# Patient Record
Sex: MALE | Race: WHITE | Employment: OTHER | URBAN - METROPOLITAN AREA
[De-identification: names, ages, dates, MRNs, and addresses within clinical notes are randomized per-mention and may not be internally consistent; named-entity substitution may affect disease eponyms.]

---

## 2022-04-21 ENCOUNTER — HOSPITAL ENCOUNTER (OUTPATIENT)
Age: 77
Discharge: HOME | End: 2022-04-21
Payer: MEDICARE

## 2022-04-21 LAB
ALBUMIN SERPL-MCNC: 4.3 GM/DL (ref 3.4–5)
ALP BLD-CCNC: 109 IU/L (ref 40–128)
ALT SERPL-CCNC: 24 U/L (ref 10–40)
ANION GAP SERPL CALCULATED.3IONS-SCNC: 11 MMOL/L (ref 4–16)
AST SERPL-CCNC: 21 IU/L (ref 15–37)
BILIRUB SERPL-MCNC: 0.7 MG/DL (ref 0–1)
BUN BLDV-MCNC: 17 MG/DL (ref 6–23)
CALCIUM SERPL-MCNC: 9.4 MG/DL (ref 8.3–10.6)
CHLORIDE BLD-SCNC: 102 MMOL/L (ref 99–110)
CHOLESTEROL: 128 MG/DL
CO2: 25 MMOL/L (ref 21–32)
CREAT SERPL-MCNC: 0.8 MG/DL (ref 0.9–1.3)
ERYTHROCYTE SEDIMENTATION RATE: 11 MM/HR (ref 0–20)
ESTIMATED AVERAGE GLUCOSE: 137 MG/DL
GFR AFRICAN AMERICAN: >60 ML/MIN/1.73M2
GFR NON-AFRICAN AMERICAN: >60 ML/MIN/1.73M2
GLUCOSE BLD-MCNC: 91 MG/DL (ref 70–99)
HBA1C MFR BLD: 6.4 % (ref 4.2–6.3)
HCT VFR BLD CALC: 44 % (ref 42–52)
HDLC SERPL-MCNC: 58 MG/DL
HEMOGLOBIN: 14.4 GM/DL (ref 13.5–18)
LDL CHOLESTEROL CALCULATED: 54 MG/DL
MAGNESIUM: 2.1 MG/DL (ref 1.8–2.4)
MCH RBC QN AUTO: 32.6 PG (ref 27–31)
MCHC RBC AUTO-ENTMCNC: 32.7 % (ref 32–36)
MCV RBC AUTO: 99.5 FL (ref 78–100)
PDW BLD-RTO: 13.9 % (ref 11.7–14.9)
PLATELET # BLD: 219 K/CU MM (ref 140–440)
PMV BLD AUTO: 10.7 FL (ref 7.5–11.1)
POTASSIUM SERPL-SCNC: 4.6 MMOL/L (ref 3.5–5.1)
PROSTATE SPECIFIC ANTIGEN: 1.22 NG/ML (ref 0–4)
RBC # BLD: 4.42 M/CU MM (ref 4.6–6.2)
SODIUM BLD-SCNC: 138 MMOL/L (ref 135–145)
T4 FREE: 1.41 NG/DL (ref 0.9–1.8)
TOTAL PROTEIN: 6.4 GM/DL (ref 6.4–8.2)
TRIGL SERPL-MCNC: 79 MG/DL
TSH HIGH SENSITIVITY: 3.85 UIU/ML (ref 0.27–4.2)
URIC ACID: 6.4 MG/DL (ref 3.5–7.2)
WBC # BLD: 6.2 K/CU MM (ref 4–10.5)

## 2022-04-21 PROCEDURE — 85027 COMPLETE CBC AUTOMATED: CPT

## 2022-04-21 PROCEDURE — 83735 ASSAY OF MAGNESIUM: CPT

## 2022-04-21 PROCEDURE — G0103 PSA SCREENING: HCPCS

## 2022-04-21 PROCEDURE — 84443 ASSAY THYROID STIM HORMONE: CPT

## 2022-04-21 PROCEDURE — 83036 HEMOGLOBIN GLYCOSYLATED A1C: CPT

## 2022-04-21 PROCEDURE — 80053 COMPREHEN METABOLIC PANEL: CPT

## 2022-04-21 PROCEDURE — 84550 ASSAY OF BLOOD/URIC ACID: CPT

## 2022-04-21 PROCEDURE — 36415 COLL VENOUS BLD VENIPUNCTURE: CPT

## 2022-04-21 PROCEDURE — 85652 RBC SED RATE AUTOMATED: CPT

## 2022-04-21 PROCEDURE — 80061 LIPID PANEL: CPT

## 2022-04-21 PROCEDURE — 84439 ASSAY OF FREE THYROXINE: CPT

## 2022-04-27 ENCOUNTER — HOSPITAL ENCOUNTER (OUTPATIENT)
Dept: WOMENS IMAGING | Age: 77
Discharge: HOME | End: 2022-04-27
Payer: MEDICARE

## 2022-04-27 DIAGNOSIS — M81.0 OSTEOPOROSIS, UNSPECIFIED OSTEOPOROSIS TYPE, UNSPECIFIED PATHOLOGICAL FRACTURE PRESENCE: ICD-10-CM

## 2022-04-27 PROCEDURE — 77080 DXA BONE DENSITY AXIAL: CPT

## 2022-06-01 ENCOUNTER — HOSPITAL ENCOUNTER (OUTPATIENT)
Dept: CT IMAGING | Age: 77
Discharge: HOME | End: 2022-06-01
Payer: MEDICARE

## 2022-06-01 DIAGNOSIS — I71.20 THORACIC AORTIC ANEURYSM WITHOUT RUPTURE: ICD-10-CM

## 2022-06-01 LAB
GFR AFRICAN AMERICAN: >60 ML/MIN/1.73M2
GFR NON-AFRICAN AMERICAN: >60 ML/MIN/1.73M2
POC CREATININE: 0.9 MG/DL (ref 0.9–1.3)

## 2022-06-01 PROCEDURE — 71275 CT ANGIOGRAPHY CHEST: CPT

## 2022-06-01 PROCEDURE — 2580000003 HC RX 258: Performed by: INTERNAL MEDICINE

## 2022-06-01 PROCEDURE — 6360000004 HC RX CONTRAST MEDICATION: Performed by: INTERNAL MEDICINE

## 2022-06-01 RX ORDER — SODIUM CHLORIDE 0.9 % (FLUSH) 0.9 %
10 SYRINGE (ML) INJECTION PRN
Status: DISCONTINUED | OUTPATIENT
Start: 2022-06-01 | End: 2022-06-02 | Stop reason: HOSPADM

## 2022-06-01 RX ADMIN — SODIUM CHLORIDE, PRESERVATIVE FREE 10 ML: 5 INJECTION INTRAVENOUS at 13:05

## 2022-06-01 RX ADMIN — IOPAMIDOL 95 ML: 755 INJECTION, SOLUTION INTRAVENOUS at 13:07

## 2022-06-13 ENCOUNTER — HOSPITAL ENCOUNTER (OUTPATIENT)
Dept: NON INVASIVE DIAGNOSTICS | Age: 77
Discharge: HOME | End: 2022-06-13
Payer: MEDICARE

## 2022-06-13 VITALS
RESPIRATION RATE: 19 BRPM | SYSTOLIC BLOOD PRESSURE: 109 MMHG | DIASTOLIC BLOOD PRESSURE: 53 MMHG | HEART RATE: 74 BPM | OXYGEN SATURATION: 93 %

## 2022-06-13 PROCEDURE — 93312 ECHO TRANSESOPHAGEAL: CPT

## 2022-06-13 PROCEDURE — 7100000000 HC PACU RECOVERY - FIRST 15 MIN

## 2022-06-13 PROCEDURE — 7100000001 HC PACU RECOVERY - ADDTL 15 MIN

## 2022-06-13 NOTE — H&P
[address] [place] [address], [address] [place] [address]                              HISTORY AND PHYSICAL    PATIENT NAME: Michel Mcdermott                     :        1945  MED REC NO:   [id number]                          ROOM:  ACCOUNT NO:   [de-identified]                           ADMIT DATE: 2022  PROVIDER:     Crystal Shaver MD    INDICATION:  Bicuspid aortic valve, aortic regurgitation, ascending  aortic aneurysm. HISTORY OF PRESENT ILLNESS:  This is a 79-year-old male patient who has  been followed as an outpatient. He has ascending aortic aneurysm  present. The patient was also seen by Dr. Any Deshpande also. The patient  is here for ESTRADA and aortic valve evaluation. The patient is relatively  active. The patient denies any chest pain or shortness of breath  present. No dizziness or lightheadedness present. The patient had a CT of the chest done not long ago in  here at the  hospital, on 2022. CT chest showed aneurysm of the ascending  aorta measuring 4.7 cm. No evidence of dissection present. He said it  was a little bit larger when _____ [place] last year. It appears to be  4.7 cm at this time. PAST MEDICAL HISTORY:  Seizure disorder, he is on Keppra. He has  hypertension, hyperlipidemia, bicuspid aortic valve, moderate aortic  stenosis and hyperlipidemia, carotid disease, hypertension, diastolic  dysfunction present. PAST SURGICAL HISTORY:  None. MEDICATIONS:  Alendronate, aspirin, Coreg, Keppra, Lasix, Lipitor,  _____, multivitamins. ALLERGIES:  SULFA. PHYSICAL EXAMINATION:  GENERAL:  The patient is awake, alert, and answering questions, not in  acute distress. VITAL SIGNS:  Temperature afebrile. Pulse is 65. Blood pressure is  133/80. HEENT:  Head is normocephalic and atraumatic. Pupils are equal and  reactive. CHEST:  Equal expansion. LUNGS:  Clear to auscultation.   No wheezing or rhonchi appreciated. HEART:  Regular rate and rhythm. ABDOMEN:  Soft and nontender. Bowel sounds are present. No  hepatosplenomegaly or guarding appreciated. EXTREMITIES:  No cyanosis or clubbing noted. NEUROLOGIC:  Cranial nerves II through II are grossly intact. LABORATORY DATA:  BUN is 17, creatinine 0.8. Total cholesterol is 128. LFTs are normal.  CBC is within normal range. IMPRESSION:  A 31-year-old male patient with ascending root aneurysm  present. He is on low dose beta-blockers, aspirin, statins and Lasix. The patient is here for ESTRADA to evaluate for his ascending root aneurysm  and aortic valve pathology. The patient appears to have bicuspid aortic  valve present. PLAN:  I will plan to proceed with ESTRADA. I will make further  recommendations. ASA is III. Mallampati is III.         Rosmery Mitchell MD    D: 06/13/2022 9:19:21       T: 06/13/2022 14:33:14     [id number]  Job#: [id number]     Doc#: [id number]    CC:

## 2022-06-13 NOTE — PROCEDURES
[address], [address] [place] [address]                                 ECHOCARDIOGRAM    PATIENT NAME: Georgi Kahn                     :        1945  MED REC NO:   [id number]                          ROOM:  ACCOUNT NO:   [de-identified]                           ADMIT DATE: 2022  PROVIDER:     Melanie Moore MD    ESTRADA    INDICATION:  Bicuspid aortic valve, aortic regurgitation. This is a 79-year-old male patient, who is brought to noninvasive lab. The patient received 4 mg of Versed and 25 mcg of fentanyl. Posterior  oropharynx was anesthetized using lidocaine gel. A mouthguard was  placed. A ESTRADA probe was advanced to the posterior oropharynx and mid  esophagus. Images were obtained. Left atrium is mild to moderately enlarged. No clot or thrombus noted  in the left atrium or left atrial appendage. Mitral valve is normal.   There is mild mitral regurgitation. LV function is preserved at 50-55%. Mild left ventricular hypertrophy present. Aortic valve is a bicuspid  aortic valve. One of the leaflet is opening and closing well, the other  one is slightly restricted. There is mild to moderate aortic  regurgitation. Ascending aortic root is dilated. It is about 4.7 cm. In the sinuses, it is about 4.5, in the distal about 4.7 cm. No ASD or  PFO noted. Color Doppler and bubble study both were negative. Tricuspid valve is normal.  Pulmonic valve is normal.    IMPRESSION:  1. Mild to moderate left atrial enlargement present. 2.  No clot or thrombus noted in the left atrium, left atrial appendage  or LV cavity. 3.  Mitral valve is normal.  Mild mitral regurgitation noted. 4.  LV function is preserved greater than 55%. 5.  No pericardial effusion noted. 6.  No ASD or PFO noted.   7.  Tricuspid valve and pulmonic valve are normal.  8.  Aortic valve is bicuspid valve, one of the leaflet is opening and  closing normally, the other one is restricted and calcified. Mild to  moderate aortic regurgitation noted. Ascending root is dilated about  4.7 cm present. The patient tolerated the procedure well. No complications noted. PLAN:  At this time, close observation is recommended. The patient  _____ with bicuspid aortic valve with no significant stenosis noted. Ascending root is dilated. We will continue beta-blockers and we will  have him follow up. We will make further recommendations.         Nalini Hernandes MD    D: 06/13/2022 9:34:28       T: 06/13/2022 12:53:09     [id number]  Job#: [id number]     Doc#: [id number]    CC:

## 2023-01-26 ENCOUNTER — HOSPITAL ENCOUNTER (OUTPATIENT)
Age: 78
Discharge: HOME | End: 2023-01-26
Payer: MEDICARE

## 2023-01-26 LAB
ALBUMIN SERPL-MCNC: 4.3 GM/DL (ref 3.4–5)
ALP BLD-CCNC: 81 IU/L (ref 40–128)
ALT SERPL-CCNC: 26 U/L (ref 10–40)
ANION GAP SERPL CALCULATED.3IONS-SCNC: 7 MMOL/L (ref 4–16)
AST SERPL-CCNC: 23 IU/L (ref 15–37)
BASOPHILS ABSOLUTE: 0.1 K/CU MM
BASOPHILS RELATIVE PERCENT: 0.8 % (ref 0–1)
BILIRUB SERPL-MCNC: 0.7 MG/DL (ref 0–1)
BUN BLDV-MCNC: 14 MG/DL (ref 6–23)
CALCIUM SERPL-MCNC: 9 MG/DL (ref 8.3–10.6)
CHLORIDE BLD-SCNC: 103 MMOL/L (ref 99–110)
CHOLESTEROL: 117 MG/DL
CO2: 29 MMOL/L (ref 21–32)
CREAT SERPL-MCNC: 0.9 MG/DL (ref 0.9–1.3)
CREATININE URINE: 162 MG/DL (ref 39–259)
DIFFERENTIAL TYPE: ABNORMAL
EOSINOPHILS ABSOLUTE: 0.3 K/CU MM
EOSINOPHILS RELATIVE PERCENT: 3.8 % (ref 0–3)
ERYTHROCYTE SEDIMENTATION RATE: 5 MM/HR (ref 0–20)
ESTIMATED AVERAGE GLUCOSE: 128 MG/DL
GFR SERPL CREATININE-BSD FRML MDRD: >60 ML/MIN/1.73M2
GLUCOSE BLD-MCNC: 97 MG/DL (ref 70–99)
HBA1C MFR BLD: 6.1 % (ref 4.2–6.3)
HCT VFR BLD CALC: 42.6 % (ref 42–52)
HDLC SERPL-MCNC: 60 MG/DL
HEMOGLOBIN: 13.6 GM/DL (ref 13.5–18)
IMMATURE NEUTROPHIL %: 0.5 % (ref 0–0.43)
LDL CHOLESTEROL CALCULATED: 45 MG/DL
LYMPHOCYTES ABSOLUTE: 1.5 K/CU MM
LYMPHOCYTES RELATIVE PERCENT: 22.8 % (ref 24–44)
MAGNESIUM: 2.1 MG/DL (ref 1.8–2.4)
MCH RBC QN AUTO: 31.9 PG (ref 27–31)
MCHC RBC AUTO-ENTMCNC: 31.9 % (ref 32–36)
MCV RBC AUTO: 99.8 FL (ref 78–100)
MICROALBUMIN/CREAT 24H UR: <1.2 MG/DL
MICROALBUMIN/CREAT UR-RTO: NORMAL MG/G CREAT (ref 0–30)
MONOCYTES ABSOLUTE: 0.7 K/CU MM
MONOCYTES RELATIVE PERCENT: 11.3 % (ref 0–4)
NUCLEATED RBC %: 0 %
PDW BLD-RTO: 14 % (ref 11.7–14.9)
PLATELET # BLD: 192 K/CU MM (ref 140–440)
PMV BLD AUTO: 11.4 FL (ref 7.5–11.1)
POTASSIUM SERPL-SCNC: 4.2 MMOL/L (ref 3.5–5.1)
RBC # BLD: 4.27 M/CU MM (ref 4.6–6.2)
SEGMENTED NEUTROPHILS ABSOLUTE COUNT: 4 K/CU MM
SEGMENTED NEUTROPHILS RELATIVE PERCENT: 60.8 % (ref 36–66)
SODIUM BLD-SCNC: 139 MMOL/L (ref 135–145)
T4 FREE: 1.44 NG/DL (ref 0.9–1.8)
TOTAL IMMATURE NEUTOROPHIL: 0.03 K/CU MM
TOTAL NUCLEATED RBC: 0 K/CU MM
TOTAL PROTEIN: 6.2 GM/DL (ref 6.4–8.2)
TRIGL SERPL-MCNC: 58 MG/DL
TSH HIGH SENSITIVITY: 3.17 UIU/ML (ref 0.27–4.2)
URIC ACID: 6.4 MG/DL (ref 3.5–7.2)
WBC # BLD: 6.6 K/CU MM (ref 4–10.5)

## 2023-01-26 PROCEDURE — 84443 ASSAY THYROID STIM HORMONE: CPT

## 2023-01-26 PROCEDURE — 36415 COLL VENOUS BLD VENIPUNCTURE: CPT

## 2023-01-26 PROCEDURE — 83036 HEMOGLOBIN GLYCOSYLATED A1C: CPT

## 2023-01-26 PROCEDURE — 84550 ASSAY OF BLOOD/URIC ACID: CPT

## 2023-01-26 PROCEDURE — 80053 COMPREHEN METABOLIC PANEL: CPT

## 2023-01-26 PROCEDURE — 83735 ASSAY OF MAGNESIUM: CPT

## 2023-01-26 PROCEDURE — 82570 ASSAY OF URINE CREATININE: CPT

## 2023-01-26 PROCEDURE — 85652 RBC SED RATE AUTOMATED: CPT

## 2023-01-26 PROCEDURE — 85025 COMPLETE CBC W/AUTO DIFF WBC: CPT

## 2023-01-26 PROCEDURE — 80061 LIPID PANEL: CPT

## 2023-01-26 PROCEDURE — 84153 ASSAY OF PSA TOTAL: CPT

## 2023-01-26 PROCEDURE — 84439 ASSAY OF FREE THYROXINE: CPT

## 2023-01-26 PROCEDURE — 84154 ASSAY OF PSA FREE: CPT

## 2023-01-26 PROCEDURE — 82043 UR ALBUMIN QUANTITATIVE: CPT

## 2023-01-27 LAB
PROSTATE SPECIFIC ANTIGEN FREE: 0.4 NG/ML
PROSTATE SPECIFIC ANTIGEN PERCENT FREE: 25 %
PROSTATE SPECIFIC ANTIGEN: 1.6 NG/ML (ref 0–4)

## 2023-02-20 ENCOUNTER — HOSPITAL ENCOUNTER (OUTPATIENT)
Age: 78
Discharge: HOME | End: 2023-02-20
Payer: MEDICARE

## 2023-02-20 LAB
BASOPHILS ABSOLUTE: 0 K/CU MM
BASOPHILS RELATIVE PERCENT: 0.7 % (ref 0–1)
DIFFERENTIAL TYPE: ABNORMAL
EOSINOPHILS ABSOLUTE: 0.3 K/CU MM
EOSINOPHILS RELATIVE PERCENT: 5.2 % (ref 0–3)
ERYTHROCYTE SEDIMENTATION RATE: 20 MM/HR (ref 0–20)
FERRITIN: 364 NG/ML (ref 30–400)
FOLATE SERPL-MCNC: 13.7 NG/ML (ref 3.1–17.5)
HCT VFR BLD CALC: 44.1 % (ref 42–52)
HEMOGLOBIN: 14.6 GM/DL (ref 13.5–18)
IMMATURE NEUTROPHIL %: 0.3 % (ref 0–0.43)
IRON: 126 UG/DL (ref 59–158)
LYMPHOCYTES ABSOLUTE: 1.7 K/CU MM
LYMPHOCYTES RELATIVE PERCENT: 28.6 % (ref 24–44)
MCH RBC QN AUTO: 32.4 PG (ref 27–31)
MCHC RBC AUTO-ENTMCNC: 33.1 % (ref 32–36)
MCV RBC AUTO: 97.8 FL (ref 78–100)
MONOCYTES ABSOLUTE: 0.7 K/CU MM
MONOCYTES RELATIVE PERCENT: 11.5 % (ref 0–4)
NUCLEATED RBC %: 0 %
PCT TRANSFERRIN: 42 % (ref 10–44)
PDW BLD-RTO: 13.5 % (ref 11.7–14.9)
PLATELET # BLD: 202 K/CU MM (ref 140–440)
PMV BLD AUTO: 11 FL (ref 7.5–11.1)
RBC # BLD: 4.51 M/CU MM (ref 4.6–6.2)
SEGMENTED NEUTROPHILS ABSOLUTE COUNT: 3.1 K/CU MM
SEGMENTED NEUTROPHILS RELATIVE PERCENT: 53.7 % (ref 36–66)
TOTAL IMMATURE NEUTOROPHIL: 0.02 K/CU MM
TOTAL IRON BINDING CAPACITY: 302 UG/DL (ref 250–450)
TOTAL NUCLEATED RBC: 0 K/CU MM
UNSATURATED IRON BINDING CAPACITY: 176 UG/DL (ref 110–370)
VITAMIN B-12: 692.8 PG/ML (ref 211–911)
WBC # BLD: 5.8 K/CU MM (ref 4–10.5)

## 2023-02-20 PROCEDURE — 82746 ASSAY OF FOLIC ACID SERUM: CPT

## 2023-02-20 PROCEDURE — 85652 RBC SED RATE AUTOMATED: CPT

## 2023-02-20 PROCEDURE — 83010 ASSAY OF HAPTOGLOBIN QUANT: CPT

## 2023-02-20 PROCEDURE — 83540 ASSAY OF IRON: CPT

## 2023-02-20 PROCEDURE — 83550 IRON BINDING TEST: CPT

## 2023-02-20 PROCEDURE — 82607 VITAMIN B-12: CPT

## 2023-02-20 PROCEDURE — 82728 ASSAY OF FERRITIN: CPT

## 2023-02-20 PROCEDURE — 85025 COMPLETE CBC W/AUTO DIFF WBC: CPT

## 2023-02-20 PROCEDURE — 36415 COLL VENOUS BLD VENIPUNCTURE: CPT

## 2023-02-22 LAB — HAPTOGLOB SERPL-MCNC: 179 MG/DL (ref 30–200)

## 2023-05-10 ENCOUNTER — OFFICE VISIT (OUTPATIENT)
Dept: ORTHOPEDIC SURGERY | Age: 78
End: 2023-05-10

## 2023-05-10 VITALS — SYSTOLIC BLOOD PRESSURE: 145 MMHG | DIASTOLIC BLOOD PRESSURE: 90 MMHG | HEART RATE: 66 BPM | OXYGEN SATURATION: 98 %

## 2023-05-10 DIAGNOSIS — M67.40 GANGLION CYST: Primary | ICD-10-CM

## 2023-05-10 ASSESSMENT — ENCOUNTER SYMPTOMS
DIARRHEA: 0
NAUSEA: 0
BACK PAIN: 0
COUGH: 0
VOMITING: 0
SORE THROAT: 0
COLOR CHANGE: 0
SHORTNESS OF BREATH: 0
WHEEZING: 0

## 2023-05-10 NOTE — PROGRESS NOTES
Patient is here for consult on left forearm cyst as diagnosed and referred by his PCP. Patient states this mass has been present for 10 years. 2/10 Pain with pressure applied, no pain when no pressure is applied. No history of injury or surgery in left arm.

## 2023-05-10 NOTE — PROGRESS NOTES
5/10/2023   Chief Complaint   Patient presents with    Arm Pain     Patient is here for consult on his left forearm, possible cyst        History of Present Illness:                             Mayo Pryor is a 70 y.o. male right hand-dominant male referred by PCP for evaluation and treatment of a left forearm mass, possible ganglion cyst.  Patient states that this has been present for approximately 10 years and is essentially nonpainful unless he applies direct pressure. He has had no treatment for it thus far. It is been the same consistency for the last 10 years without any changes. No additional orthopedic complaints including any associated numbness or tingling. No advanced imaging thus far. This my first time seeing the patient. No associated prior injury as well. Related history: negative for prior surgery, trauma, arthritis or disorders. Is affecting ADLs. Pain is 2/10 at it's worst.    Outside reports reviewed: none. Patient's medications, allergies, past medical, surgical, social and family histories were reviewed and updated as appropriate. Patient has had no treatment thus far. Medical History  Patient's medications, allergies, past medical, surgical, social and family histories were reviewed and updated as appropriate. Past Medical History:   Diagnosis Date    Aneurysm ([facility]     Thoracic    Cancer ([facility]     skin    Hyperlipidemia     Sleep apnea     bipap     No past surgical history on file.   Family History   Problem Relation Age of Onset    Heart Disease Mother     Heart Disease Father     Heart Disease Brother      Social History     Socioeconomic History    Marital status:    Tobacco Use    Smoking status: Former     Years: 20.00     Types: Cigarettes    Smokeless tobacco: Never   Vaping Use    Vaping Use: Never used   Substance and Sexual Activity    Alcohol use: Not Currently    Drug use: Never     Current Outpatient Medications   Medication Sig Dispense

## 2023-08-01 ENCOUNTER — HOSPITAL ENCOUNTER (OUTPATIENT)
Age: 78
Discharge: HOME | End: 2023-08-01
Payer: MEDICARE

## 2023-08-01 LAB
ANION GAP SERPL CALCULATED.3IONS-SCNC: 10 MMOL/L (ref 4–16)
APTT: 29.4 SECONDS (ref 25.1–37.1)
BASOPHILS ABSOLUTE: 0.1 K/CU MM
BASOPHILS RELATIVE PERCENT: 0.8 % (ref 0–1)
BUN SERPL-MCNC: 13 MG/DL (ref 6–23)
CALCIUM SERPL-MCNC: 9.1 MG/DL (ref 8.3–10.6)
CHLORIDE BLD-SCNC: 99 MMOL/L (ref 99–110)
CO2: 26 MMOL/L (ref 21–32)
CREAT SERPL-MCNC: 0.8 MG/DL (ref 0.9–1.3)
DIFFERENTIAL TYPE: ABNORMAL
EOSINOPHILS ABSOLUTE: 0.3 K/CU MM
EOSINOPHILS RELATIVE PERCENT: 5.1 % (ref 0–3)
GFR SERPL CREATININE-BSD FRML MDRD: >60 ML/MIN/1.73M2
GLUCOSE SERPL-MCNC: 132 MG/DL (ref 70–99)
HCT VFR BLD CALC: 41.8 % (ref 42–52)
HEMOGLOBIN: 13.4 GM/DL (ref 13.5–18)
IMMATURE NEUTROPHIL %: 0.3 % (ref 0–0.43)
INR BLD: 1 INDEX
LYMPHOCYTES ABSOLUTE: 1.5 K/CU MM
LYMPHOCYTES RELATIVE PERCENT: 24 % (ref 24–44)
MCH RBC QN AUTO: 31.5 PG (ref 27–31)
MCHC RBC AUTO-ENTMCNC: 32.1 % (ref 32–36)
MCV RBC AUTO: 98.1 FL (ref 78–100)
MONOCYTES ABSOLUTE: 0.8 K/CU MM
MONOCYTES RELATIVE PERCENT: 12.2 % (ref 0–4)
NUCLEATED RBC %: 0 %
PDW BLD-RTO: 13.9 % (ref 11.7–14.9)
PLATELET # BLD: 194 K/CU MM (ref 140–440)
PMV BLD AUTO: 10.8 FL (ref 7.5–11.1)
POTASSIUM SERPL-SCNC: 4.2 MMOL/L (ref 3.5–5.1)
PROTHROMBIN TIME: 13 SECONDS (ref 11.7–14.5)
RBC # BLD: 4.26 M/CU MM (ref 4.6–6.2)
SEGMENTED NEUTROPHILS ABSOLUTE COUNT: 3.7 K/CU MM
SEGMENTED NEUTROPHILS RELATIVE PERCENT: 57.6 % (ref 36–66)
SODIUM BLD-SCNC: 135 MMOL/L (ref 135–145)
TOTAL IMMATURE NEUTOROPHIL: 0.02 K/CU MM
TOTAL NUCLEATED RBC: 0 K/CU MM
WBC # BLD: 6.3 K/CU MM (ref 4–10.5)

## 2023-08-01 PROCEDURE — 85610 PROTHROMBIN TIME: CPT

## 2023-08-01 PROCEDURE — 80048 BASIC METABOLIC PNL TOTAL CA: CPT

## 2023-08-01 PROCEDURE — 85730 THROMBOPLASTIN TIME PARTIAL: CPT

## 2023-08-01 PROCEDURE — 85025 COMPLETE CBC W/AUTO DIFF WBC: CPT

## 2023-08-01 PROCEDURE — 36415 COLL VENOUS BLD VENIPUNCTURE: CPT

## 2024-01-22 ENCOUNTER — HOSPITAL ENCOUNTER (OUTPATIENT)
Age: 79
Discharge: HOME | End: 2024-01-22
Payer: MEDICARE

## 2024-01-22 LAB
25(OH)D3 SERPL-MCNC: 32.96 NG/ML
ALBUMIN SERPL-MCNC: 4.3 GM/DL (ref 3.4–5)
ALP BLD-CCNC: 94 IU/L (ref 40–129)
ALT SERPL-CCNC: 19 U/L (ref 10–40)
ANION GAP SERPL CALCULATED.3IONS-SCNC: 15 MMOL/L (ref 7–16)
AST SERPL-CCNC: 19 IU/L (ref 15–37)
BASOPHILS ABSOLUTE: 0 K/CU MM
BASOPHILS RELATIVE PERCENT: 0.6 % (ref 0–1)
BILIRUB SERPL-MCNC: 0.6 MG/DL (ref 0–1)
BILIRUBIN DIRECT: 0.2 MG/DL (ref 0–0.3)
BILIRUBIN URINE: NEGATIVE MG/DL
BILIRUBIN, INDIRECT: 0.4 MG/DL (ref 0–0.7)
BLOOD, URINE: NEGATIVE
BUN SERPL-MCNC: 12 MG/DL (ref 6–23)
CALCIUM SERPL-MCNC: 8.8 MG/DL (ref 8.3–10.6)
CHLORIDE BLD-SCNC: 102 MMOL/L (ref 99–110)
CHOLEST SERPL-MCNC: 127 MG/DL
CLARITY: CLEAR
CO2: 24 MMOL/L (ref 21–32)
COLOR: YELLOW
COMMENT UA: NORMAL
CREAT SERPL-MCNC: 0.7 MG/DL (ref 0.9–1.3)
DIFFERENTIAL TYPE: ABNORMAL
EOSINOPHILS ABSOLUTE: 0.4 K/CU MM
EOSINOPHILS RELATIVE PERCENT: 6 % (ref 0–3)
ERYTHROCYTE SEDIMENTATION RATE: 14 MM/HR (ref 0–20)
ESTIMATED AVERAGE GLUCOSE: 128 MG/DL
GFR SERPL CREATININE-BSD FRML MDRD: >60 ML/MIN/1.73M2
GLUCOSE SERPL-MCNC: 107 MG/DL (ref 70–99)
GLUCOSE, URINE: NEGATIVE MG/DL
HBA1C MFR BLD: 6.1 % (ref 4.2–6.3)
HCT VFR BLD CALC: 44 % (ref 42–52)
HDLC SERPL-MCNC: 57 MG/DL
HEMOGLOBIN: 14.2 GM/DL (ref 13.5–18)
IMMATURE NEUTROPHIL %: 0.3 % (ref 0–0.43)
KETONES, URINE: NEGATIVE MG/DL
LDLC SERPL CALC-MCNC: 56 MG/DL
LEUKOCYTE ESTERASE, URINE: NEGATIVE
LYMPHOCYTES ABSOLUTE: 1.5 K/CU MM
LYMPHOCYTES RELATIVE PERCENT: 23.1 % (ref 24–44)
MAGNESIUM: 2.1 MG/DL (ref 1.8–2.4)
MCH RBC QN AUTO: 32.4 PG (ref 27–31)
MCHC RBC AUTO-ENTMCNC: 32.3 % (ref 32–36)
MCV RBC AUTO: 100.5 FL (ref 78–100)
MONOCYTES ABSOLUTE: 0.7 K/CU MM
MONOCYTES RELATIVE PERCENT: 10.6 % (ref 0–4)
NITRITE URINE, QUANTITATIVE: NEGATIVE
NUCLEATED RBC %: 0 %
PDW BLD-RTO: 13.5 % (ref 11.7–14.9)
PH, URINE: 7 (ref 5–8)
PLATELET # BLD: 210 K/CU MM (ref 140–440)
PMV BLD AUTO: 10.9 FL (ref 7.5–11.1)
POTASSIUM SERPL-SCNC: 4.7 MMOL/L (ref 3.5–5.1)
PROTEIN UA: NEGATIVE MG/DL
RBC # BLD: 4.38 M/CU MM (ref 4.6–6.2)
SEGMENTED NEUTROPHILS ABSOLUTE COUNT: 3.8 K/CU MM
SEGMENTED NEUTROPHILS RELATIVE PERCENT: 59.4 % (ref 36–66)
SODIUM BLD-SCNC: 141 MMOL/L (ref 135–145)
SPECIFIC GRAVITY UA: 1.01 (ref 1–1.03)
T4 FREE SERPL-MCNC: 1.41 NG/DL (ref 0.9–1.8)
TOTAL IMMATURE NEUTOROPHIL: 0.02 K/CU MM
TOTAL NUCLEATED RBC: 0 K/CU MM
TOTAL PROTEIN: 6.6 GM/DL (ref 6.4–8.2)
TRIGL SERPL-MCNC: 71 MG/DL
TSH SERPL DL<=0.005 MIU/L-ACNC: 2.57 UIU/ML (ref 0.27–4.2)
URATE SERPL-MCNC: 5.2 MG/DL (ref 3.5–7.2)
UROBILINOGEN, URINE: 0.2 MG/DL (ref 0.2–1)
WBC # BLD: 6.3 K/CU MM (ref 4–10.5)

## 2024-01-22 PROCEDURE — 84439 ASSAY OF FREE THYROXINE: CPT

## 2024-01-22 PROCEDURE — 82248 BILIRUBIN DIRECT: CPT

## 2024-01-22 PROCEDURE — 83036 HEMOGLOBIN GLYCOSYLATED A1C: CPT

## 2024-01-22 PROCEDURE — 84550 ASSAY OF BLOOD/URIC ACID: CPT

## 2024-01-22 PROCEDURE — 80061 LIPID PANEL: CPT

## 2024-01-22 PROCEDURE — 84443 ASSAY THYROID STIM HORMONE: CPT

## 2024-01-22 PROCEDURE — 85652 RBC SED RATE AUTOMATED: CPT

## 2024-01-22 PROCEDURE — 80053 COMPREHEN METABOLIC PANEL: CPT

## 2024-01-22 PROCEDURE — 85025 COMPLETE CBC W/AUTO DIFF WBC: CPT

## 2024-01-22 PROCEDURE — 81003 URINALYSIS AUTO W/O SCOPE: CPT

## 2024-01-22 PROCEDURE — 82306 VITAMIN D 25 HYDROXY: CPT

## 2024-01-22 PROCEDURE — 36415 COLL VENOUS BLD VENIPUNCTURE: CPT

## 2024-01-22 PROCEDURE — 83735 ASSAY OF MAGNESIUM: CPT

## 2024-10-09 ENCOUNTER — HOSPITAL ENCOUNTER (OUTPATIENT)
Dept: GENERAL RADIOLOGY | Age: 79
Discharge: HOME | End: 2024-10-09
Payer: MEDICARE

## 2024-10-09 ENCOUNTER — HOSPITAL ENCOUNTER (OUTPATIENT)
Age: 79
Discharge: HOME | End: 2024-10-09
Payer: MEDICARE

## 2024-10-09 DIAGNOSIS — M79.673 ACUTE FOOT PAIN, UNSPECIFIED LATERALITY: ICD-10-CM

## 2024-10-09 PROCEDURE — 73630 X-RAY EXAM OF FOOT: CPT

## 2025-07-15 ENCOUNTER — APPOINTMENT (OUTPATIENT)
Dept: CT IMAGING | Age: 80
End: 2025-07-15
Payer: MEDICARE

## 2025-07-15 ENCOUNTER — HOSPITAL ENCOUNTER (EMERGENCY)
Age: 80
Discharge: HOME | End: 2025-07-15
Attending: STUDENT IN AN ORGANIZED HEALTH CARE EDUCATION/TRAINING PROGRAM
Payer: MEDICARE

## 2025-07-15 VITALS
DIASTOLIC BLOOD PRESSURE: 89 MMHG | HEART RATE: 93 BPM | WEIGHT: 200 LBS | BODY MASS INDEX: 26.51 KG/M2 | RESPIRATION RATE: 22 BRPM | HEIGHT: 73 IN | TEMPERATURE: 98.6 F | SYSTOLIC BLOOD PRESSURE: 125 MMHG | OXYGEN SATURATION: 94 %

## 2025-07-15 DIAGNOSIS — G40.919 BREAKTHROUGH SEIZURE (HCC): Primary | ICD-10-CM

## 2025-07-15 LAB
ANION GAP SERPL CALCULATED.3IONS-SCNC: 11 MMOL/L (ref 9–17)
BASOPHILS # BLD: 0.01 K/UL
BASOPHILS NFR BLD: 0 % (ref 0–1)
BUN SERPL-MCNC: 10 MG/DL (ref 7–20)
CALCIUM SERPL-MCNC: 8.8 MG/DL (ref 8.3–10.6)
CHLORIDE SERPL-SCNC: 102 MMOL/L (ref 99–110)
CO2 SERPL-SCNC: 23 MMOL/L (ref 21–32)
CREAT SERPL-MCNC: 0.8 MG/DL (ref 0.8–1.3)
EOSINOPHIL # BLD: 0.01 K/UL
EOSINOPHILS RELATIVE PERCENT: 0 % (ref 0–3)
ERYTHROCYTE [DISTWIDTH] IN BLOOD BY AUTOMATED COUNT: 14.3 % (ref 11.7–14.9)
GFR, ESTIMATED: 90 ML/MIN/1.73M2
GLUCOSE SERPL-MCNC: 120 MG/DL (ref 74–99)
HCT VFR BLD AUTO: 40.5 % (ref 42–52)
HGB BLD-MCNC: 13.6 G/DL (ref 13.5–18)
IMM GRANULOCYTES # BLD AUTO: 0.03 K/UL
IMM GRANULOCYTES NFR BLD: 0 %
LYMPHOCYTES NFR BLD: 0.85 K/UL
LYMPHOCYTES RELATIVE PERCENT: 11 % (ref 24–44)
MCH RBC QN AUTO: 32.9 PG (ref 27–31)
MCHC RBC AUTO-ENTMCNC: 33.6 G/DL (ref 32–36)
MCV RBC AUTO: 97.8 FL (ref 78–100)
MONOCYTES NFR BLD: 0.74 K/UL
MONOCYTES NFR BLD: 9 % (ref 0–5)
NEUTROPHILS NFR BLD: 79 % (ref 36–66)
NEUTS SEG NFR BLD: 6.21 K/UL
PLATELET # BLD AUTO: 192 K/UL (ref 140–440)
PMV BLD AUTO: 10.2 FL (ref 7.5–11.1)
POTASSIUM SERPL-SCNC: 4.7 MMOL/L (ref 3.5–5.1)
RBC # BLD AUTO: 4.14 M/UL (ref 4.6–6.2)
SODIUM SERPL-SCNC: 136 MMOL/L (ref 136–145)
WBC OTHER # BLD: 7.9 K/UL (ref 4–10.5)

## 2025-07-15 PROCEDURE — 85025 COMPLETE CBC W/AUTO DIFF WBC: CPT

## 2025-07-15 PROCEDURE — 72125 CT NECK SPINE W/O DYE: CPT

## 2025-07-15 PROCEDURE — 6360000002 HC RX W HCPCS: Performed by: STUDENT IN AN ORGANIZED HEALTH CARE EDUCATION/TRAINING PROGRAM

## 2025-07-15 PROCEDURE — 6370000000 HC RX 637 (ALT 250 FOR IP): Performed by: STUDENT IN AN ORGANIZED HEALTH CARE EDUCATION/TRAINING PROGRAM

## 2025-07-15 PROCEDURE — 96375 TX/PRO/DX INJ NEW DRUG ADDON: CPT

## 2025-07-15 PROCEDURE — 99284 EMERGENCY DEPT VISIT MOD MDM: CPT

## 2025-07-15 PROCEDURE — 80177 DRUG SCRN QUAN LEVETIRACETAM: CPT

## 2025-07-15 PROCEDURE — 96374 THER/PROPH/DIAG INJ IV PUSH: CPT

## 2025-07-15 PROCEDURE — 70450 CT HEAD/BRAIN W/O DYE: CPT

## 2025-07-15 PROCEDURE — 80048 BASIC METABOLIC PNL TOTAL CA: CPT

## 2025-07-15 RX ORDER — ONDANSETRON 4 MG/1
4 TABLET, ORALLY DISINTEGRATING ORAL EVERY 30 MIN PRN
Status: DISCONTINUED | OUTPATIENT
Start: 2025-07-15 | End: 2025-07-15 | Stop reason: HOSPADM

## 2025-07-15 RX ORDER — METOCLOPRAMIDE HYDROCHLORIDE 5 MG/ML
10 INJECTION INTRAMUSCULAR; INTRAVENOUS ONCE
Status: COMPLETED | OUTPATIENT
Start: 2025-07-15 | End: 2025-07-15

## 2025-07-15 RX ORDER — ONDANSETRON 4 MG/1
4 TABLET, ORALLY DISINTEGRATING ORAL ONCE
Status: DISCONTINUED | OUTPATIENT
Start: 2025-07-15 | End: 2025-07-15 | Stop reason: HOSPADM

## 2025-07-15 RX ORDER — ALENDRONATE SODIUM 70 MG/1
70 TABLET ORAL
COMMUNITY

## 2025-07-15 RX ORDER — KETOROLAC TROMETHAMINE 15 MG/ML
15 INJECTION, SOLUTION INTRAMUSCULAR; INTRAVENOUS ONCE
Status: COMPLETED | OUTPATIENT
Start: 2025-07-15 | End: 2025-07-15

## 2025-07-15 RX ORDER — ACETAMINOPHEN 500 MG
1000 TABLET ORAL
Status: COMPLETED | OUTPATIENT
Start: 2025-07-15 | End: 2025-07-15

## 2025-07-15 RX ADMIN — ACETAMINOPHEN 1000 MG: 500 TABLET ORAL at 17:50

## 2025-07-15 RX ADMIN — KETOROLAC TROMETHAMINE 15 MG: 15 INJECTION, SOLUTION INTRAMUSCULAR; INTRAVENOUS at 19:58

## 2025-07-15 RX ADMIN — ONDANSETRON 4 MG: 4 TABLET, ORALLY DISINTEGRATING ORAL at 17:50

## 2025-07-15 RX ADMIN — METOCLOPRAMIDE 10 MG: 5 INJECTION, SOLUTION INTRAMUSCULAR; INTRAVENOUS at 19:58

## 2025-07-15 ASSESSMENT — PAIN DESCRIPTION - LOCATION
LOCATION: HEAD

## 2025-07-15 ASSESSMENT — PAIN DESCRIPTION - DESCRIPTORS
DESCRIPTORS: ACHING
DESCRIPTORS: THROBBING
DESCRIPTORS: ACHING
DESCRIPTORS: ACHING

## 2025-07-15 ASSESSMENT — PAIN SCALES - GENERAL
PAINLEVEL_OUTOF10: 5
PAINLEVEL_OUTOF10: 4
PAINLEVEL_OUTOF10: 8
PAINLEVEL_OUTOF10: 8

## 2025-07-15 ASSESSMENT — PAIN DESCRIPTION - PAIN TYPE: TYPE: ACUTE PAIN

## 2025-07-15 ASSESSMENT — PAIN DESCRIPTION - FREQUENCY: FREQUENCY: CONTINUOUS

## 2025-07-15 NOTE — ED NOTES
Medication History  [facility]    Patient Name: Sridhar Escalante 1945     Medication history has been completed by: Justine Pizano [facility]    Source(s) of information: patient' family and insurance claims     Primary Care Physician: Anselmo Jason MD     Pharmacy: [facility]    Allergies as of 07/15/2025 - Fully Reviewed 07/15/2025   Allergen Reaction Noted    Sulfa antibiotics Other (See Comments) 04/26/2022        Prior to Admission medications    Medication Sig Start Date End Date Taking? Authorizing Provider   aspirin 81 MG EC tablet Take 1 tablet by mouth daily   Yes Stephanie Oneill MD   atorvastatin (LIPITOR) 40 MG tablet Take 1 tablet by mouth at bedtime   Yes Stephanie Oneill MD   carvedilol (COREG) 3.125 MG tablet Take 1 tablet by mouth 2 times daily (with meals)   Yes Stephanie Oneill MD   levETIRAcetam (KEPPRA) 750 MG tablet Take 1 tablet by mouth 2 times daily   Yes Stephanie Oneill MD   niacin 500 MG extended release capsule Take 1 capsule by mouth nightly   Yes Stephanie Oneill MD   Lysine 500 MG TABS Take 500 mg by mouth three times a week   Yes Stephanie Oneill MD   alendronate (FOSAMAX) 70 MG tablet Take 1 tablet by mouth every 7 days Takes on Thursday    Stephanie Oneill MD   furosemide (LASIX) 20 MG tablet Take 1 tablet by mouth daily    Stephanie Oneill MD   Multiple Vitamins-Minerals (EYE VITAMINS & MINERALS PO) Take 1 tablet by mouth daily    Stephanie Oneill MD     Medications added or changed (ex. new medication, dosage change, interval change, formulation change):  Alendronate (added)  Levetiracetam dosage changed from 500 mg BID to 750 mg BID.     Comments:  Medication list reviewed with patient's family and insurance claims verified.  Family reports patient took am medications but had emesis shortly after. States unsure they were digested.  Family reports patient taking furosemide per claims last dispensed

## 2025-07-15 NOTE — ED PROVIDER NOTES
[address]  [facility] [address]  [phone]    Call today  To make an appointment for recheck and follow-up    [facility] Emergency Department  [facility] [address]  [place] [id number]  [phone]  Go today  If symptoms worsen    Disposition medications (if applicable):  New Prescriptions    No medications on file     ED Provider Disposition Time  DISPOSITION Decision To Discharge 07/15/2025 06:31:35 PM   DISPOSITION CONDITION Stable           Comment: Please note this report has been produced using speech recognition software and may contain errors related to that system including errors in grammar, punctuation, and spelling, as well as words and phrases that may be inappropriate.  Efforts were made to edit the dictations.        Renzo Velarde DO  07/15/25 [id number]

## 2025-07-16 LAB
KEPPRA DOSE AMT: NORMAL
KEPPRA: 15 UG/ML (ref 6–46)